# Patient Record
Sex: MALE | Race: AMERICAN INDIAN OR ALASKA NATIVE
[De-identification: names, ages, dates, MRNs, and addresses within clinical notes are randomized per-mention and may not be internally consistent; named-entity substitution may affect disease eponyms.]

---

## 2018-09-27 ENCOUNTER — HOSPITAL ENCOUNTER (EMERGENCY)
Dept: HOSPITAL 5 - ED | Age: 38
Discharge: HOME | End: 2018-09-27
Payer: SELF-PAY

## 2018-09-27 VITALS — DIASTOLIC BLOOD PRESSURE: 84 MMHG | SYSTOLIC BLOOD PRESSURE: 126 MMHG

## 2018-09-27 DIAGNOSIS — F17.200: ICD-10-CM

## 2018-09-27 DIAGNOSIS — Z88.6: ICD-10-CM

## 2018-09-27 DIAGNOSIS — J45.21: Primary | ICD-10-CM

## 2018-09-27 PROCEDURE — 93010 ELECTROCARDIOGRAM REPORT: CPT

## 2018-09-27 PROCEDURE — 99283 EMERGENCY DEPT VISIT LOW MDM: CPT

## 2018-09-27 PROCEDURE — 71046 X-RAY EXAM CHEST 2 VIEWS: CPT

## 2018-09-27 PROCEDURE — 96372 THER/PROPH/DIAG INJ SC/IM: CPT

## 2018-09-27 PROCEDURE — 93005 ELECTROCARDIOGRAM TRACING: CPT

## 2018-09-27 PROCEDURE — 94640 AIRWAY INHALATION TREATMENT: CPT

## 2018-09-27 NOTE — XRAY REPORT
ROUTINE CHEST, TWO VIEWS:



HISTORY:  Wheezing.



The trachea, heart, mediastinal contour, lung fields and bony thorax 

are unremarkable.



IMPRESSION:

Unremarkable chest x-ray.

## 2018-09-27 NOTE — EMERGENCY DEPARTMENT REPORT
ED Asthma HPI





- General


Chief Complaint: Adult Asthma


Stated Complaint: CHEST PAIN/SOB


Time Seen by Provider: 09/27/18 14:23


Source: patient


Mode of arrival: Ambulatory


Limitations: No Limitations





- History of Present Illness


Initial Comments: 





This is a 37-year-old male nontoxic, well nourished in appearance, no acute 

signs of distress presents to the ED with c/o of acute on chronic asthma 

exacerbation and shortness of breathe.  Patient stated she is out of her 

albuterol inhaler 1 month.  Patient denies any cough.  Patient denies any sick 

contact.  Patient denies any recent travels, long car, recent hospital stays.  

Patient denies any calf pain or calf tenderness.  Patient denies any chest pain

, fever, chills, nausea, vomiting, hemoptysis, numbness, tingling, headache or 

stiff neck.  Patient stated allergies to Ibuprofen. PMH includes asthma.


MD Complaint: shortness of breath, wheezing


-: days(s) (1)


Asthma History: childhood onset


Severity: mild


Context: none known


Associated Symptoms: none





- Related Data


Current Asthma Therapy: none


 Previous Rx's











 Medication  Instructions  Recorded  Last Taken  Type


 


Butalb/Acetamin/Caff -40 1 tab PO Q6HR PRN #14 tab 08/26/18 Unknown Rx





[Fioricet]    


 


ALBUTEROL Inhaler(NF) [VENTOLIN 2 puff IH Q4-6H PRN #1 inha 09/27/18 Unknown Rx





Inhaler(NF)]    


 


Prednisone [predniSONE 10 mg 10 mg PO .TAPER #1 tab.ds.pk 09/27/18 Unknown Rx





(6-Day Pack, 21 Tabs)]    











 Allergies











Allergy/AdvReac Type Severity Reaction Status Date / Time


 


ibuprofen [From Motrin] Allergy  Hives Verified 09/27/18 13:58














ED Review of Systems


ROS: 


Stated complaint: CHEST PAIN/SOB


Other details as noted in HPI





Constitutional: denies: chills, fever


Eyes: denies: eye pain, eye discharge, vision change


ENT: denies: ear pain, throat pain


Respiratory: shortness of breath, wheezing.  denies: cough


Cardiovascular: denies: chest pain, palpitations


Endocrine: no symptoms reported


Gastrointestinal: denies: abdominal pain, nausea, diarrhea


Genitourinary: denies: urgency, dysuria


Musculoskeletal: denies: back pain, joint swelling, arthralgia


Skin: denies: rash, lesions


Neurological: denies: headache, weakness, paresthesias


Psychiatric: denies: anxiety, depression


Hematological/Lymphatic: denies: easy bleeding, easy bruising





ED Past Medical Hx





- Past Medical History


Hx Asthma: Yes





- Social History


Smoking Status: Current Every Day Smoker


Substance Use Type: None





- Medications


Home Medications: 


 Home Medications











 Medication  Instructions  Recorded  Confirmed  Last Taken  Type


 


Butalb/Acetamin/Caff -40 1 tab PO Q6HR PRN #14 tab 08/26/18  Unknown Rx





[Fioricet]     


 


ALBUTEROL Inhaler(NF) [VENTOLIN 2 puff IH Q4-6H PRN #1 inha 09/27/18  Unknown Rx





Inhaler(NF)]     


 


Prednisone [predniSONE 10 mg 10 mg PO .TAPER #1 tab.ds.pk 09/27/18  Unknown Rx





(6-Day Pack, 21 Tabs)]     














ED Physical Exam





- General


Limitations: No Limitations


General appearance: alert, in no apparent distress





- Head


Head exam: Present: atraumatic, normocephalic





- Eye


Eye exam: Present: normal appearance


Pupils: Present: normal accommodation





- ENT


ENT exam: Present: normal exam, mucous membranes moist





- Neck


Neck exam: Present: normal inspection, full ROM





- Respiratory


Respiratory exam: Present: normal lung sounds bilaterally, wheezes (bilateral 

upper and lower lobes).  Absent: respiratory distress, rales, rhonchi, stridor, 

chest wall tenderness, accessory muscle use, decreased breath sounds, prolonged 

expiratory





- Cardiovascular


Cardiovascular Exam: Present: regular rate, normal rhythm, normal heart sounds.

  Absent: bradycardia, tachycardia, irregular rhythm, systolic murmur, 

diastolic murmur, rubs, gallop





- GI/Abdominal


GI/Abdominal exam: Present: soft, normal bowel sounds.  Absent: distended, 

tenderness, guarding, rebound, rigid, diminished bowel sounds





- Rectal


Rectal exam: Present: deferred





- Extremities Exam


Extremities exam: Present: normal inspection





- Back Exam


Back exam: Present: normal inspection





- Neurological Exam


Neurological exam: Present: alert, oriented X3





- Psychiatric


Psychiatric exam: Present: normal affect, normal mood





- Skin


Skin exam: Present: warm, dry, intact, normal color.  Absent: rash





ED Course





 Vital Signs











  09/27/18





  13:52


 


Temperature 99 F


 


Pulse Rate 71


 


Respiratory 24





Rate 


 


Blood Pressure 124/76


 


O2 Sat by Pulse 98





Oximetry 














- Reevaluation(s)


Reevaluation #1: 





09/27/18 14:57


Patient is speaking in full sentences with no signs of distress noted.





ED Medical Decision Making





- EKG Data


When compared to previous EKG there are: no significant change


Interpretation: no acute changes, normal EKG, other (no ST abnormalities)





09/27/18 14:57


Signed an reviewed by DEWEY Walden





- Medical Decision Making





This is a 37-year-old male that presents with asthma exacerbation.  Patient is 

stable and was examined by me.  Chest x-ray has been obtained and dictated by 

the radiologist within normal limits.  Patient is notified of the x-ray report 

with no questions noted by the patient.  EKG normal sinus rhythm.  Patient did 

receive breathing treatment and steroids in the ED which patient the symptoms 

has resolved and subsided.  Posttreatment and there is no wheezing upon 

auscultation.  Patient is discharged with albuterol and prednisone.  Patient 

was referred to Follow-up with a primary care doctor in 3-5 days or if symptoms 

worsen and continue return to emergency room as soon as possible.  At time of 

discharge, the patient does not seem toxic or ill in appearance.  No acute 

signs of distress noted.  Patient agrees to discharge treatment plan of care.  

No further questions noted by the patient.





This chart is dictated with using Dragon Dictation Program


Critical care attestation.: 


If time is entered above; I have spent that time in minutes in the direct care 

of this critically ill patient, excluding procedure time.








ED Disposition


Clinical Impression: 


Asthma exacerbation


Qualifiers:


 Asthma severity: mild Asthma persistence: intermittent Qualified Code(s): 

J45.21 - Mild intermittent asthma with (acute) exacerbation





Disposition: DC-01 TO HOME OR SELFCARE


Is pt being admited?: No


Does the pt Need Aspirin: No


Condition: Stable


Instructions:  Asthma (ED)


Additional Instructions: 


Follow-up with a primary care doctor in 3-5 days or if symptoms worsen and 

continue return to emergency room as soon as possible. 


Prescriptions: 


ALBUTEROL Inhaler(NF) [VENTOLIN Inhaler(NF)] 2 puff IH Q4-6H PRN #1 inha


 PRN Reason: Wheezing


Prednisone [predniSONE 10 mg (6-Day Pack, 21 Tabs)] 10 mg PO .TAPER #1 tab.ds.pk


Referrals: 


PRIMARY CARE,MD [Primary Care Provider] - 3-5 Days


YOUSIF AUGUSTE MD [Staff Physician] - 3-5 Days


[facility] [Outside] - 3-5 Days


[facility] [Outside] - 3-5 Days


Forms:  Work/School Release Form(ED)

## 2020-11-12 ENCOUNTER — HOSPITAL ENCOUNTER (EMERGENCY)
Dept: HOSPITAL 5 - ED | Age: 40
Discharge: HOME | End: 2020-11-12
Payer: SELF-PAY

## 2020-11-12 VITALS — DIASTOLIC BLOOD PRESSURE: 87 MMHG | SYSTOLIC BLOOD PRESSURE: 139 MMHG

## 2020-11-12 DIAGNOSIS — J45.909: ICD-10-CM

## 2020-11-12 DIAGNOSIS — R07.89: Primary | ICD-10-CM

## 2020-11-12 DIAGNOSIS — F17.200: ICD-10-CM

## 2020-11-12 LAB
BASOPHILS # (AUTO): 0 K/MM3 (ref 0–0.1)
BASOPHILS NFR BLD AUTO: 0.4 % (ref 0–1.8)
BUN SERPL-MCNC: 12 MG/DL (ref 9–20)
BUN/CREAT SERPL: 17 %
CALCIUM SERPL-MCNC: 9.2 MG/DL (ref 8.4–10.2)
EOSINOPHIL # BLD AUTO: 0.2 K/MM3 (ref 0–0.4)
EOSINOPHIL NFR BLD AUTO: 2.4 % (ref 0–4.3)
HCT VFR BLD CALC: 44.1 % (ref 35.5–45.6)
HEMOLYSIS INDEX: 6
HGB BLD-MCNC: 14.8 GM/DL (ref 11.8–15.2)
LYMPHOCYTES # BLD AUTO: 1.8 K/MM3 (ref 1.2–5.4)
LYMPHOCYTES NFR BLD AUTO: 19 % (ref 13.4–35)
MCHC RBC AUTO-ENTMCNC: 34 % (ref 32–34)
MCV RBC AUTO: 104 FL (ref 84–94)
MONOCYTES # (AUTO): 0.5 K/MM3 (ref 0–0.8)
MONOCYTES % (AUTO): 5 % (ref 0–7.3)
PLATELET # BLD: 227 K/MM3 (ref 140–440)
RBC # BLD AUTO: 4.25 M/MM3 (ref 3.65–5.03)

## 2020-11-12 PROCEDURE — 84484 ASSAY OF TROPONIN QUANT: CPT

## 2020-11-12 PROCEDURE — 93005 ELECTROCARDIOGRAM TRACING: CPT

## 2020-11-12 PROCEDURE — 36415 COLL VENOUS BLD VENIPUNCTURE: CPT

## 2020-11-12 PROCEDURE — 71046 X-RAY EXAM CHEST 2 VIEWS: CPT

## 2020-11-12 PROCEDURE — 80048 BASIC METABOLIC PNL TOTAL CA: CPT

## 2020-11-12 PROCEDURE — 85025 COMPLETE CBC W/AUTO DIFF WBC: CPT

## 2020-11-12 NOTE — EMERGENCY DEPARTMENT REPORT
ED General Adult HPI





- General


Chief complaint: Chest Pain


Stated complaint: CHEST PAIN


Time Seen by Provider: 11/12/20 16:25


Source: patient


Mode of arrival: Ambulatory


Limitations: No Limitations





- History of Present Illness


Initial comments: 





The patient presents to the emergency department the chief complaint of right 

upper chest pain that has been present for the last 2 days.  Patient denies any 

recent travel or shortness of breath.  Patient states the pain initially started

at work while lifting a box.  Patient states any repetitive movement makes the 

pain worse.  He denies any abdominal pain.  Patient also denies any history of 

hypertension, diabetes, or elevated cholesterol levels.  Patient states the pain

has been constant for the last 2 days.


-: Sudden, days(s) (2)


Location: chest


Severity scale (0 -10): 9


Quality: sharp


Consistency: constant


Improves with: rest


Worsens with: movement


Associated Symptoms: denies other symptoms


Treatments Prior to Arrival: none





- Related Data


                                  Previous Rx's











 Medication  Instructions  Recorded  Last Taken  Type


 


Cyclobenzaprine HCl [Flexeril 5 MG 5 mg PO BID PRN #10 tab 11/12/20 Unknown Rx





TAB]    











                                    Allergies











Allergy/AdvReac Type Severity Reaction Status Date / Time


 


ibuprofen [From Motrin] Allergy  Hives Verified 09/27/18 13:58














ED Review of Systems


ROS: 


Stated complaint: CHEST PAIN


Other details as noted in HPI





Comment: All other systems reviewed and negative


Constitutional: denies: chills, fever


Eyes: denies: eye pain, eye discharge, vision change


ENT: denies: ear pain, throat pain


Respiratory: denies: cough, shortness of breath, wheezing


Cardiovascular: chest pain.  denies: palpitations


Endocrine: no symptoms reported


Gastrointestinal: denies: abdominal pain, nausea, diarrhea


Genitourinary: denies: urgency, dysuria


Musculoskeletal: denies: back pain, joint swelling, arthralgia


Skin: denies: rash, lesions


Neurological: denies: headache, weakness, paresthesias


Psychiatric: denies: anxiety, depression


Hematological/Lymphatic: denies: easy bleeding, easy bruising





ED Past Medical Hx





- Past Medical History


Previous Medical History?: Yes


Hx Asthma: Yes





- Surgical History


Past Surgical History?: No





- Social History


Smoking Status: Current Every Day Smoker


Substance Use Type: None





- Medications


Home Medications: 


                                Home Medications











 Medication  Instructions  Recorded  Confirmed  Last Taken  Type


 


Cyclobenzaprine HCl [Flexeril 5 MG 5 mg PO BID PRN #10 tab 11/12/20  Unknown Rx





TAB]     














ED Physical Exam





- General


Limitations: No Limitations


General appearance: alert, in no apparent distress





- Head


Head exam: Present: atraumatic, normocephalic





- Eye


Eye exam: Present: normal appearance, PERRL, EOMI





- ENT


ENT exam: Present: mucous membranes moist





- Neck


Neck exam: Present: normal inspection





- Respiratory


Respiratory exam: Present: normal lung sounds bilaterally, chest wall tenderness

(Tenderness palpation lateral right chest wall).  Absent: respiratory distress





- Cardiovascular


Cardiovascular Exam: Present: regular rate, normal rhythm.  Absent: systolic 

murmur, diastolic murmur, rubs, gallop





- GI/Abdominal


GI/Abdominal exam: Present: soft, normal bowel sounds





- Rectal


Rectal exam: Present: deferred





- Extremities Exam


Extremities exam: Present: normal inspection





- Back Exam


Back exam: Present: normal inspection





- Neurological Exam


Neurological exam: Present: alert, oriented X3





- Psychiatric


Psychiatric exam: Present: normal affect, normal mood





- Skin


Skin exam: Present: warm, dry, intact, normal color.  Absent: rash





ED Course


                                   Vital Signs











  11/12/20 11/12/20





  09:59 15:56


 


Temperature 97.9 F 


 


Pulse Rate 77 73


 


Respiratory 18 18





Rate  


 


Blood Pressure 146/96 139/87





[Right]  


 


O2 Sat by Pulse 97 98





Oximetry  














ED Medical Decision Making





- Lab Data


Result diagrams: 


                                 11/12/20 11:51





                                 11/12/20 11:51








                                   Lab Results











  11/12/20 11/12/20 Range/Units





  11:51 11:51 


 


WBC  9.7   (4.5-11.0)  K/mm3


 


RBC  4.25   (3.65-5.03)  M/mm3


 


Hgb  14.8   (11.8-15.2)  gm/dl


 


Hct  44.1   (35.5-45.6)  %


 


MCV  104 H   (84-94)  fl


 


MCH  35 H   (28-32)  pg


 


MCHC  34   (32-34)  %


 


RDW  13.2   (13.2-15.2)  %


 


Plt Count  227   (140-440)  K/mm3


 


Lymph % (Auto)  19.0   (13.4-35.0)  %


 


Mono % (Auto)  5.0   (0.0-7.3)  %


 


Eos % (Auto)  2.4   (0.0-4.3)  %


 


Baso % (Auto)  0.4   (0.0-1.8)  %


 


Lymph # (Auto)  1.8   (1.2-5.4)  K/mm3


 


Mono # (Auto)  0.5   (0.0-0.8)  K/mm3


 


Eos # (Auto)  0.2   (0.0-0.4)  K/mm3


 


Baso # (Auto)  0.0   (0.0-0.1)  K/mm3


 


Seg Neutrophils %  73.2 H   (40.0-70.0)  %


 


Seg Neutrophils #  7.1   (1.8-7.7)  K/mm3


 


Sodium   140  (137-145)  mmol/L


 


Potassium   4.3  (3.6-5.0)  mmol/L


 


Chloride   103.7  ()  mmol/L


 


Carbon Dioxide   29  (22-30)  mmol/L


 


Anion Gap   12  mmol/L


 


BUN   12  (9-20)  mg/dL


 


Creatinine   0.7 L  (0.8-1.3)  mg/dL


 


Estimated GFR   > 60  ml/min


 


BUN/Creatinine Ratio   17  %


 


Glucose   93  ()  mg/dL


 


Calcium   9.2  (8.4-10.2)  mg/dL


 


Troponin T   < 0.010  (0.00-0.029)  ng/mL














- EKG Data


-: EKG Interpreted by Me


EKG shows normal: sinus rhythm


Rate: normal





- Radiology Data


Radiology results: report reviewed





- Medical Decision Making





Discussed results with patient


Patient politely declined further workup





Critical care attestation.: 


If time is entered above; I have spent that time in minutes in the direct care 

of this critically ill patient, excluding procedure time.








ED Disposition


Clinical Impression: 


 Chest wall pain





Disposition: DC-01 TO HOME OR SELFCARE


Is pt being admited?: No


Does the pt Need Aspirin: No


Condition: Stable


Instructions:  Chest Pain (ED), Chest Wall Pain


Additional Instructions: 


return if worse


Referrals: 


PRIMARY CARE,MD [Primary Care Provider] - 3-5 Days


JEFFERSON FRANCISCO MD [Staff Physician] - 3-5 Days


Forms:  Work/School Release Form(ED)


Time of Disposition: 16:39

## 2020-11-12 NOTE — XRAY REPORT
CHEST 2 VIEWS 



INDICATION / CLINICAL INFORMATION:

Chest Pain.



COMPARISON: 

9/27/2018



FINDINGS:



SUPPORT DEVICES: None.



HEART / MEDIASTINUM: No significant abnormality. 



LUNGS / PLEURA: No significant pulmonary or pleural abnormality. No pneumothorax. 



ADDITIONAL FINDINGS: No significant additional findings.



IMPRESSION:

1. No acute findings. No significant interval change.



Signer Name: Jaron Mina MD 

Signed: 11/12/2020 11:27 AM

Workstation Name: [id number]

## 2022-09-21 ENCOUNTER — HOSPITAL ENCOUNTER (EMERGENCY)
Dept: HOSPITAL 5 - ED | Age: 42
Discharge: LEFT BEFORE DISCHARGE | End: 2022-09-21
Payer: MEDICAID

## 2022-09-21 VITALS — SYSTOLIC BLOOD PRESSURE: 148 MMHG | DIASTOLIC BLOOD PRESSURE: 95 MMHG

## 2022-09-21 DIAGNOSIS — Z53.21: ICD-10-CM

## 2022-09-21 DIAGNOSIS — R10.30: Primary | ICD-10-CM
